# Patient Record
Sex: FEMALE | Race: OTHER | HISPANIC OR LATINO
[De-identification: names, ages, dates, MRNs, and addresses within clinical notes are randomized per-mention and may not be internally consistent; named-entity substitution may affect disease eponyms.]

---
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
## 2024-02-19 NOTE — ED PEDIATRIC NURSE NOTE - NS ED NURSE RECORDS SENT
February 19, 2024     Patient: Seth Gilbert Jr.  YOB: 2011  Date of Visit: 2/19/2024      To Whom it May Concern:    Seth iGlbert is under my professional care. Seth was seen in my office on 2/19/2024. Seth may return to sports and gym as tolerated with fingers wilder taped.      If you have any questions or concerns, please don't hesitate to call.         Sincerely,          Frankie Carter PA-C        CC: No Recipients   Medical Records sent

## 2024-12-19 NOTE — ED STATDOCS - CARE PLAN
Discharge Summary


Date of Admission: 


12/17/24 20:43





Date of Discharge: 





12/18/24


Admitting Physician: 


RODNEY GONZALES MD





Primary Care Provider: 


KARLA WOODSON








Allergies


Allergies





No Known Drug Allergies Allergy (Verified 12/17/24 17:04)


   











Hospital Summary





- Hospital Course


Hospital Course: 


12/18/24


84 years old very pleasant lady with past medical history significant for 

diabetes mellitus, hypertension, history of breast and bladder cancer currently 

in remission who lives with  quite independent in her daily life. She 

came to ER for evaluation of high blood pressure.  Patient told me she takes her

blood pressure medicine regularly and usually it runs around 130-1 40.  Today 

she felt extreme dizzy with some tingling on left side of the face and when he 

checked her blood pressure it was running 202/99.  She denies having any 

weakness numbness.  No nausea vomiting headache.  No other symptoms reported.  

In the ER initial pressure was 180/85 she was afebrile pulse of 81.  As well as 

blood workup concern all came out unremarkable except hide sugar 372.  Troponin 

0.01 to NT-proBNP 1000.  CT head was completely unremarkable. Teleneurologist 

evaluated patient advised hospitalization and giving Plavix 300 mg loading dose.

 She is admitted for MRI and to rule out stroke. Today she reports no sxs. She 

is up and walking around in the room. BP is at baseline. She states she feels 

some weakness but walking around the room fine. PT to eval. She may d/c this 

evening if MRI negative. She feels her BP may have been r/t anxiety. 








- Vitals & Intake/Output


Vital Signs: 





                                   Vital Signs











Temperature  97.9 F   12/18/24 12:00


 


Pulse Rate  83   12/18/24 12:00


 


Respiratory Rate  16   12/18/24 12:00


 


Blood Pressure  115/61   12/18/24 12:00


 


O2 Sat by Pulse Oximetry  96   12/18/24 12:00











Intake & Output: 





                                 Intake & Output











 12/16/24 12/17/24 12/18/24 12/19/24





 11:59 11:59 11:59 11:59


 


Intake Total   680 480


 


Balance   680 480


 


Weight   65.4 kg 














- Lab


Result Diagrams: 


                                 12/18/24 01:05





                                 12/18/24 01:05


Lab Results-Last 24 Hrs: 





                            Lab Results-Last 24 Hours











  12/17/24 12/17/24 12/17/24 Range/Units





  17:30 17:30 17:30 


 


WBC  7.9    (3.98-10.04)  x10^3/uL


 


RBC  3.62 L    (3.93-5.22)  x10^6/uL


 


Hgb  12.2    (11.2-15.7)  g/dL


 


Hct  37.1    (34.1-44.9)  %


 


MCV  102.5 H    (79.4-94.8)  fL


 


MCH  33.7 H    (25.6-32.2)  pg


 


MCHC  32.9    (32.2-35.5)  g/dL


 


RDW  13.4    (11.7-14.4)  %


 


Plt Count  372 H    (182-369)  x10^3/uL


 


MPV  9.4    (9.4-12.3)  fL


 


Gran %  57.5    (34.0-71.1)  %


 


Immature Gran % (Auto)  0.4    (0.001-0.429)  %


 


Nucleat RBC Rel Count  0.0    (0.00-0.2)  %


 


Eos # (Auto)  0.18    (0.04-0.36)  x10^3/uL


 


Immature Gran # (Auto)  0.03    (0.001-0.031)  x10^3u/L


 


Absolute Lymphs (auto)  2.03    (1.18-3.74)  x10^3/uL


 


Absolute Monos (auto)  1.01 H    (0.24-0.86)  x10^3/uL


 


Absolute Nucleated RBC  0.00    (0.00-0.012)  x10^3u/L


 


Lymphocytes %  25.7    (19.3-51.7)  %


 


Monocytes %  12.8 H    (4.7-12.5)  %


 


Eosinophils %  2.3    (0.7-5.8)  %


 


Basophils %  1.3 H    (0.1-1.2)  %


 


Absolute Granulocytes  4.56    (1.56-6.13)  x10^3/uL


 


Basophils #  0.10 H    (0.01-0.08)  x10^3/uL


 


Sodium   136   (135-145)  mmol/L


 


Potassium   4.6   (3.5-5.1)  mmol/L


 


Chloride   101   ()  mmol/L


 


Carbon Dioxide   26   (22-30)  mmol/L


 


Anion Gap   13.6   (5-15)  MEQ/L


 


BUN   25 H   (7-17)  mg/dL


 


Creatinine   1.40 H   (0.52-1.04)  mg/dL


 


Estimated GFR   36.9   ML/MIN


 


Glucose   123 H   ()  mg/dL


 


Calcium   10.0   (8.4-10.2)  mg/dL


 


Total Bilirubin   0.40   (0.2-1.3)  mg/dL


 


AST   26   (14-36)  U/L


 


ALT   18   (0-35)  U/L


 


Alkaline Phosphatase   68   ()  U/L


 


Troponin I    < 0.012  (0.000-0.033)  ng/mL


 


NT-Pro-B Natriuret Pep    1000  (<300)  pg/mL


 


Serum Total Protein   7.2   (6.3-8.2)  g/dL


 


Albumin   4.3   (3.5-5.0)  g/dL














  12/17/24 12/18/24 12/18/24 Range/Units





  21:25 01:00 01:05 


 


WBC    8.0  (3.98-10.04)  x10^3/uL


 


RBC    3.42 L  (3.93-5.22)  x10^6/uL


 


Hgb    11.4  (11.2-15.7)  g/dL


 


Hct    35.0  (34.1-44.9)  %


 


MCV    102.3 H  (79.4-94.8)  fL


 


MCH    33.3 H  (25.6-32.2)  pg


 


MCHC    32.6  (32.2-35.5)  g/dL


 


RDW    13.5  (11.7-14.4)  %


 


Plt Count    366  (182-369)  x10^3/uL


 


MPV    9.7  (9.4-12.3)  fL


 


Gran %     (34.0-71.1)  %


 


Immature Gran % (Auto)     (0.001-0.429)  %


 


Nucleat RBC Rel Count     (0.00-0.2)  %


 


Eos # (Auto)     (0.04-0.36)  x10^3/uL


 


Immature Gran # (Auto)     (0.001-0.031)  x10^3u/L


 


Absolute Lymphs (auto)     (1.18-3.74)  x10^3/uL


 


Absolute Monos (auto)     (0.24-0.86)  x10^3/uL


 


Absolute Nucleated RBC     (0.00-0.012)  x10^3u/L


 


Lymphocytes %     (19.3-51.7)  %


 


Monocytes %     (4.7-12.5)  %


 


Eosinophils %     (0.7-5.8)  %


 


Basophils %     (0.1-1.2)  %


 


Absolute Granulocytes     (1.56-6.13)  x10^3/uL


 


Basophils #     (0.01-0.08)  x10^3/uL


 


Sodium     (135-145)  mmol/L


 


Potassium     (3.5-5.1)  mmol/L


 


Chloride     ()  mmol/L


 


Carbon Dioxide     (22-30)  mmol/L


 


Anion Gap     (5-15)  MEQ/L


 


BUN     (7-17)  mg/dL


 


Creatinine     (0.52-1.04)  mg/dL


 


Estimated GFR     ML/MIN


 


Glucose     ()  mg/dL


 


Calcium     (8.4-10.2)  mg/dL


 


Total Bilirubin     (0.2-1.3)  mg/dL


 


AST     (14-36)  U/L


 


ALT     (0-35)  U/L


 


Alkaline Phosphatase     ()  U/L


 


Troponin I  0.015  0.014   (0.000-0.033)  ng/mL


 


NT-Pro-B Natriuret Pep     (<300)  pg/mL


 


Serum Total Protein     (6.3-8.2)  g/dL


 


Albumin     (3.5-5.0)  g/dL














  12/18/24 Range/Units





  01:05 


 


WBC   (3.98-10.04)  x10^3/uL


 


RBC   (3.93-5.22)  x10^6/uL


 


Hgb   (11.2-15.7)  g/dL


 


Hct   (34.1-44.9)  %


 


MCV   (79.4-94.8)  fL


 


MCH   (25.6-32.2)  pg


 


MCHC   (32.2-35.5)  g/dL


 


RDW   (11.7-14.4)  %


 


Plt Count   (182-369)  x10^3/uL


 


MPV   (9.4-12.3)  fL


 


Gran %   (34.0-71.1)  %


 


Immature Gran % (Auto)   (0.001-0.429)  %


 


Nucleat RBC Rel Count   (0.00-0.2)  %


 


Eos # (Auto)   (0.04-0.36)  x10^3/uL


 


Immature Gran # (Auto)   (0.001-0.031)  x10^3u/L


 


Absolute Lymphs (auto)   (1.18-3.74)  x10^3/uL


 


Absolute Monos (auto)   (0.24-0.86)  x10^3/uL


 


Absolute Nucleated RBC   (0.00-0.012)  x10^3u/L


 


Lymphocytes %   (19.3-51.7)  %


 


Monocytes %   (4.7-12.5)  %


 


Eosinophils %   (0.7-5.8)  %


 


Basophils %   (0.1-1.2)  %


 


Absolute Granulocytes   (1.56-6.13)  x10^3/uL


 


Basophils #   (0.01-0.08)  x10^3/uL


 


Sodium  136  (135-145)  mmol/L


 


Potassium  4.6  (3.5-5.1)  mmol/L


 


Chloride  104  ()  mmol/L


 


Carbon Dioxide  24  (22-30)  mmol/L


 


Anion Gap  12.6  (5-15)  MEQ/L


 


BUN  22 H  (7-17)  mg/dL


 


Creatinine  1.24 H  (0.52-1.04)  mg/dL


 


Estimated GFR  42.7  ML/MIN


 


Glucose  135 H  ()  mg/dL


 


Calcium  10.0  (8.4-10.2)  mg/dL


 


Total Bilirubin   (0.2-1.3)  mg/dL


 


AST   (14-36)  U/L


 


ALT   (0-35)  U/L


 


Alkaline Phosphatase   ()  U/L


 


Troponin I   (0.000-0.033)  ng/mL


 


NT-Pro-B Natriuret Pep   (<300)  pg/mL


 


Serum Total Protein   (6.3-8.2)  g/dL


 


Albumin   (3.5-5.0)  g/dL














- Radiology Exams


Ordered Rad Exams-Entire Visit: 





                              Radiology Procedures











 Category Date Time Status


 


 HEAD WITHOUT CONTRAST [CT] Stat Exams  12/17/24 17:13 Completed


 


 MRI BRAIN W/O CONTRAST [MRI] Routine Exams  12/18/24 10:23 Taken














- Procedures and Test


Procedures and Tests throughout Hospitalization: 





                            Therapy Orders & Screens





12/18/24 09:43


PT Eval & Treat (MD Order) ONCE 


   Reason for Eval:: weakness


   Diagnosis: HYPERTENSION, PARETHESIA, DIZZINESS














Discharge Exam


General Appearance: no apparent distress, alert


Neurologic Exam: alert, oriented x 3, cooperative, CNs II-XII nml as tested, 

normal mood/affect, nml cerebellar function, sensation nml, No motor deficits


Eye Exam: PERRL, EOMI, eyes nml inspection


Ears, Nose, Throat Exam: normal ENT inspection, pharynx normal, moist mucous 

membranes


Neck Exam: normal inspection, non-tender, supple, full range of motion


Respiratory Exam: normal breath sounds, lungs clear, No respiratory distress


Cardiovascular Exam: regular rate/rhythm, normal heart sounds


Gastrointestinal/Abdomen Exam: soft, No tenderness, No mass


Pelvic Exam: deferred


Rectal Exam: deferred


Back Exam: normal inspection, normal range of motion, No CVA tenderness, No 

vertebral tenderness


Extremity Exam: normal inspection, normal range of motion


Skin Exam: normal color, warm, dry





Final Diagnosis/Problem List





- Final Discharge Diagnosis/Problem


(1) Dizziness


Current Visit: Yes   Status: Resolved   


Assessment & Plan: 


- resolved


- CT head negative


- MRI pending


Code(s): R42 - DIZZINESS AND GIDDINESS   





(2) Facial paresthesia


Current Visit: Yes   Status: Resolved   


Assessment & Plan: 


- resolved- see above plan of care


Code(s): R20.2 - PARESTHESIA OF SKIN   





(3) Hypertension


Current Visit: Yes   Status: Chronic   


Assessment & Plan: 


- BP stable


- Continue home meds





Code(s): I10 - ESSENTIAL (PRIMARY) HYPERTENSION   





- Discharge


Discharge Date: 12/18/24


Disposition: Home, Self-Care


Condition: Stable


Prescriptions: 


Continue


   Metformin HCl 500 mg*** [Glucophage 500 MG***] 500 mg PO BIDWM


   Acetaminophen [Tylenol Arthritis] 2 tab PO DAILY PRN


     PRN Reason: Pain


   Anastrozole 1 mg PO DAILY


   Cholecalciferol (Vitamin D3) [Vitamin D3] 1 tab PO DAILY


   Lisinopril/Hydrochlorothiazide [Lisinopril-Hctz 20-12.5 mg Tab] 1 each PO 

DAILY


   Brimonidine Tartrate 1 drop OP BID


   Latanoprostene Bunod [Vyzulta] 1 drop OP HS


Follow up with: 


KARLA WOODSON MD [Primary Care Provider] - 12/30/24 3:15 pm
Discharge Summary


Date of Admission: 


12/17/24 20:43





Date of Discharge: 


12/18/24





Admitting Physician: 


RODNEY GONZALES MD





Primary Care Provider: 


KARLA WOODSON








Allergies


Allergies





No Known Drug Allergies Allergy (Verified 12/17/24 17:04)


   











Hospital Summary





- Hospital Course


Hospital Course: 


12/18/24


84 years old very pleasant lady with past medical history significant for 

diabetes mellitus, hypertension, history of breast and bladder cancer currently 

in remission who lives with  quite independent in her daily life. She 

came to ER for evaluation of high blood pressure.  Patient told me she takes her

blood pressure medicine regularly and usually it runs around 130-1 40.  Today 

she felt extreme dizzy with some tingling on left side of the face and when he 

checked her blood pressure it was running 202/99.  She denies having any 

weakness numbness.  No nausea vomiting headache.  No other symptoms reported.  

In the ER initial pressure was 180/85 she was afebrile pulse of 81.  As well as 

blood workup concern all came out unremarkable except hide sugar 372.  Troponin 

0.01 to NT-proBNP 1000.  CT head was completely unremarkable. Teleneurologist 

evaluated patient advised hospitalization and giving Plavix 300 mg loading dose.

 She is admitted for MRI and to rule out stroke. Today she reports no sxs. She 

is up and walking around in the room. BP is at baseline. She states she feels 

some weakness but walking around the room fine. PT to eval. MRI negative. She 

feels her BP may have been r/t anxiety as she had a grandson pass away 

unexpectedly. Bp elevated this afternoon and BP med restarted. She wants to stay

another night and case management ok with this. Will Continue to monitor BP 

overnight and d/c in the AM. 





12/19/24


Pt sitting in the chair ready to d/c this AM. BP controlled with medication. 

Discussed to take her morning meds as prescribed and to f/u with Dr. Woodson. She 

is feeling much better she states. Discussed to keep a log of BP results and how

to take BP at home. Discussed to take reading results to Dr. Woodson to show him 

readings throughout the day. She denies any further concerns at this time. 








- Vitals & Intake/Output


Vital Signs: 





                                   Vital Signs











Temperature  97.1 F   12/19/24 07:08


 


Pulse Rate  77   12/19/24 07:08


 


Respiratory Rate  16   12/19/24 07:08


 


Blood Pressure  142/79   12/19/24 07:08


 


O2 Sat by Pulse Oximetry  94 L  12/19/24 07:08











Intake & Output: 





                                 Intake & Output











 12/16/24 12/17/24 12/18/24 12/19/24





 11:59 11:59 11:59 11:59


 


Intake Total   680 1120


 


Output Total    450


 


Balance   680 670


 


Weight   65.4 kg 














- Lab


Result Diagrams: 


                                 12/18/24 01:05





                                 12/18/24 01:05





- Radiology Exams


Ordered Rad Exams-Entire Visit: 





                              Radiology Procedures











 Category Date Time Status


 


 HEAD WITHOUT CONTRAST [CT] Stat Exams  12/17/24 17:13 Completed


 


 MRI BRAIN W/O CONTRAST [MRI] Routine Exams  12/18/24 10:23 Completed














- Procedures and Test


Procedures and Tests throughout Hospitalization: 





                            Therapy Orders & Screens





12/18/24 09:43


PT Eval & Treat (MD Order) ONCE 


   Reason for Eval:: weakness


   Diagnosis: HYPERTENSION, PARETHESIA, DIZZINESS














Discharge Exam


General Appearance: no apparent distress, alert


Neurologic Exam: alert, oriented x 3, cooperative, normal mood/affect, nml 

cerebellar function, sensation nml, No motor deficits


Eye Exam: PERRL, EOMI, eyes nml inspection


Ears, Nose, Throat Exam: normal ENT inspection, pharynx normal, moist mucous 

membranes


Neck Exam: normal inspection, non-tender, supple, full range of motion


Respiratory Exam: normal breath sounds, lungs clear, No respiratory distress


Cardiovascular Exam: regular rate/rhythm, normal heart sounds


Gastrointestinal/Abdomen Exam: soft, No tenderness, No mass


Pelvic Exam: deferred


Rectal Exam: deferred


Back Exam: normal inspection, normal range of motion, No CVA tenderness, No 

vertebral tenderness


Extremity Exam: normal inspection, normal range of motion


Skin Exam: normal color, warm, dry





Final Diagnosis/Problem List





- Final Discharge Diagnosis/Problem


(1) Dizziness


Current Visit: Yes   Status: Resolved   Code(s): R42 - DIZZINESS AND GIDDINESS  

 





(2) Facial paresthesia


Current Visit: Yes   Status: Resolved   Code(s): R20.2 - PARESTHESIA OF SKIN   





(3) Hypertension


Current Visit: Yes   Status: Chronic   Code(s): I10 - ESSENTIAL (PRIMARY) 

HYPERTENSION   





(4) Sinus congestion


Current Visit: Yes   Status: Acute   


Assessment & Plan: 


(1) Dizziness


Current Visit: Yes   Status: Resolved   


Assessment & Plan: 


- resolved


- CT head negative


- MRI negative


Code(s): R42 - DIZZINESS AND GIDDINESS   





(2) Facial paresthesia


Current Visit: Yes   Status: Resolved   


Assessment & Plan: 


- resolved- see above plan of care


Code(s): R20.2 - PARESTHESIA OF SKIN   





(3) Hypertension


Current Visit: Yes   Status: Chronic   


Assessment & Plan: 


- BP stable


- Continue home meds


Code(s): I10 - ESSENTIAL (PRIMARY) HYPERTENSION   





(4) Sinus congestion


Current Visit: Yes   Status: Acute   


Assessment & Plan: 


- flonase








- Discharge


Discharge Date: 12/19/24


Disposition: Home, Self-Care


Condition: Stable


Prescriptions: 


Continue


   Metformin HCl 500 mg*** [Glucophage 500 MG***] 500 mg PO BIDWM


   Acetaminophen [Tylenol Arthritis] 2 tab PO DAILY PRN


     PRN Reason: Pain


   Anastrozole 1 mg PO DAILY


   Cholecalciferol (Vitamin D3) [Vitamin D3] 1 tab PO DAILY


   Lisinopril/Hydrochlorothiazide [Lisinopril-Hctz 20-12.5 mg Tab] 1 each PO 

DAILY


   Brimonidine Tartrate 1 drop OP BID


   Latanoprostene Bunod [Vyzulta] 1 drop OP HS


Follow up with: 


KARLA WOODSON MD [Primary Care Provider] - 12/30/24 3:15 pm
Principal Discharge DX:	Acute appendicitis

## 2025-02-05 NOTE — ED ADULT NURSE NOTE - PATIENT/CAREGIVER ACCEPTED INTERPRETER SERVICES
Patient presents with vaginal bleeding during pregnancy.  Labs UA sono done.  Confirmed IUP with subchorionic hemorrhage.  All results discussed.  O+ blood type.  Patient has follow-up appointment on Monday.  Discharged with OB follow-up and return precautions.
yes

## 2025-03-19 ENCOUNTER — EMERGENCY (EMERGENCY)
Facility: HOSPITAL | Age: 20
LOS: 1 days | Discharge: HOME | End: 2025-03-19
Attending: EMERGENCY MEDICINE
Payer: COMMERCIAL

## 2025-03-19 VITALS
WEIGHT: 183.65 LBS | DIASTOLIC BLOOD PRESSURE: 66 MMHG | SYSTOLIC BLOOD PRESSURE: 106 MMHG | RESPIRATION RATE: 18 BRPM | HEART RATE: 83 BPM | OXYGEN SATURATION: 100 % | TEMPERATURE: 98 F

## 2025-03-19 VITALS — DIASTOLIC BLOOD PRESSURE: 70 MMHG | SYSTOLIC BLOOD PRESSURE: 109 MMHG

## 2025-03-19 LAB
ALBUMIN SERPL ELPH-MCNC: 4.6 G/DL — SIGNIFICANT CHANGE UP (ref 3.3–5.2)
ALP SERPL-CCNC: 99 U/L — SIGNIFICANT CHANGE UP (ref 40–120)
ALT FLD-CCNC: 20 U/L — SIGNIFICANT CHANGE UP
ANION GAP SERPL CALC-SCNC: 15 MMOL/L — SIGNIFICANT CHANGE UP (ref 5–17)
APPEARANCE UR: ABNORMAL
APTT BLD: 34.1 SEC — SIGNIFICANT CHANGE UP (ref 24.5–35.6)
AST SERPL-CCNC: 16 U/L — SIGNIFICANT CHANGE UP
BACTERIA # UR AUTO: ABNORMAL /HPF
BASOPHILS # BLD AUTO: 0.05 K/UL — SIGNIFICANT CHANGE UP (ref 0–0.2)
BASOPHILS NFR BLD AUTO: 0.4 % — SIGNIFICANT CHANGE UP (ref 0–2)
BILIRUB SERPL-MCNC: 0.4 MG/DL — SIGNIFICANT CHANGE UP (ref 0.4–2)
BILIRUB UR-MCNC: NEGATIVE — SIGNIFICANT CHANGE UP
BLD GP AB SCN SERPL QL: SIGNIFICANT CHANGE UP
BUN SERPL-MCNC: 9.4 MG/DL — SIGNIFICANT CHANGE UP (ref 8–20)
CALCIUM SERPL-MCNC: 9.5 MG/DL — SIGNIFICANT CHANGE UP (ref 8.4–10.5)
CAST: 1 /LPF — SIGNIFICANT CHANGE UP (ref 0–4)
CHLORIDE SERPL-SCNC: 101 MMOL/L — SIGNIFICANT CHANGE UP (ref 96–108)
CO2 SERPL-SCNC: 23 MMOL/L — SIGNIFICANT CHANGE UP (ref 22–29)
COLOR SPEC: YELLOW — SIGNIFICANT CHANGE UP
CREAT SERPL-MCNC: 0.44 MG/DL — LOW (ref 0.5–1.3)
DIFF PNL FLD: ABNORMAL
EGFR: 142 ML/MIN/1.73M2 — SIGNIFICANT CHANGE UP
EGFR: 142 ML/MIN/1.73M2 — SIGNIFICANT CHANGE UP
EOSINOPHIL # BLD AUTO: 0.24 K/UL — SIGNIFICANT CHANGE UP (ref 0–0.5)
EOSINOPHIL NFR BLD AUTO: 1.8 % — SIGNIFICANT CHANGE UP (ref 0–6)
GLUCOSE SERPL-MCNC: 76 MG/DL — SIGNIFICANT CHANGE UP (ref 70–99)
GLUCOSE UR QL: NEGATIVE MG/DL — SIGNIFICANT CHANGE UP
HCG SERPL-ACNC: <4 MIU/ML — SIGNIFICANT CHANGE UP
HCT VFR BLD CALC: 39.7 % — SIGNIFICANT CHANGE UP (ref 34.5–45)
HGB BLD-MCNC: 12.6 G/DL — SIGNIFICANT CHANGE UP (ref 11.5–15.5)
IMM GRANULOCYTES # BLD AUTO: 0.06 K/UL — SIGNIFICANT CHANGE UP (ref 0–0.07)
IMM GRANULOCYTES NFR BLD AUTO: 0.4 % — SIGNIFICANT CHANGE UP (ref 0–0.9)
INR BLD: 1.09 RATIO — SIGNIFICANT CHANGE UP (ref 0.85–1.16)
KETONES UR-MCNC: 15 MG/DL
LEUKOCYTE ESTERASE UR-ACNC: NEGATIVE — SIGNIFICANT CHANGE UP
LYMPHOCYTES # BLD AUTO: 3.37 K/UL — HIGH (ref 1–3.3)
LYMPHOCYTES NFR BLD AUTO: 25.2 % — SIGNIFICANT CHANGE UP (ref 13–44)
MCHC RBC-ENTMCNC: 28.6 PG — SIGNIFICANT CHANGE UP (ref 27–34)
MCHC RBC-ENTMCNC: 31.7 G/DL — LOW (ref 32–36)
MCV RBC AUTO: 90 FL — SIGNIFICANT CHANGE UP (ref 80–100)
MONOCYTES # BLD AUTO: 0.96 K/UL — HIGH (ref 0–0.9)
MONOCYTES NFR BLD AUTO: 7.2 % — SIGNIFICANT CHANGE UP (ref 2–14)
NEUTROPHILS # BLD AUTO: 8.67 K/UL — HIGH (ref 1.8–7.4)
NEUTROPHILS NFR BLD AUTO: 65 % — SIGNIFICANT CHANGE UP (ref 43–77)
NITRITE UR-MCNC: NEGATIVE — SIGNIFICANT CHANGE UP
NRBC # BLD AUTO: 0 K/UL — SIGNIFICANT CHANGE UP (ref 0–0)
NRBC # FLD: 0 K/UL — SIGNIFICANT CHANGE UP (ref 0–0)
NRBC BLD AUTO-RTO: 0 /100 WBCS — SIGNIFICANT CHANGE UP (ref 0–0)
PH UR: 6.5 — SIGNIFICANT CHANGE UP (ref 5–8)
PLATELET # BLD AUTO: 296 K/UL — SIGNIFICANT CHANGE UP (ref 150–400)
PMV BLD: 11 FL — SIGNIFICANT CHANGE UP (ref 7–13)
POTASSIUM SERPL-MCNC: 4 MMOL/L — SIGNIFICANT CHANGE UP (ref 3.5–5.3)
POTASSIUM SERPL-SCNC: 4 MMOL/L — SIGNIFICANT CHANGE UP (ref 3.5–5.3)
PROT SERPL-MCNC: 8 G/DL — SIGNIFICANT CHANGE UP (ref 6.6–8.7)
PROT UR-MCNC: SIGNIFICANT CHANGE UP MG/DL
PROTHROM AB SERPL-ACNC: 12.6 SEC — SIGNIFICANT CHANGE UP (ref 9.9–13.4)
RAPID RVP RESULT: SIGNIFICANT CHANGE UP
RBC # BLD: 4.41 M/UL — SIGNIFICANT CHANGE UP (ref 3.8–5.2)
RBC # FLD: 13.3 % — SIGNIFICANT CHANGE UP (ref 10.3–14.5)
RBC CASTS # UR COMP ASSIST: 9 /HPF — HIGH (ref 0–4)
SARS-COV-2 RNA SPEC QL NAA+PROBE: SIGNIFICANT CHANGE UP
SODIUM SERPL-SCNC: 138 MMOL/L — SIGNIFICANT CHANGE UP (ref 135–145)
SP GR SPEC: 1.01 — SIGNIFICANT CHANGE UP (ref 1–1.03)
SQUAMOUS # UR AUTO: 3 /HPF — SIGNIFICANT CHANGE UP (ref 0–5)
UROBILINOGEN FLD QL: 0.2 MG/DL — SIGNIFICANT CHANGE UP (ref 0.2–1)
WBC # BLD: 13.35 K/UL — HIGH (ref 3.8–10.5)
WBC # FLD AUTO: 13.35 K/UL — HIGH (ref 3.8–10.5)
WBC UR QL: 5 /HPF — SIGNIFICANT CHANGE UP (ref 0–5)

## 2025-03-19 PROCEDURE — 74177 CT ABD & PELVIS W/CONTRAST: CPT | Mod: 26

## 2025-03-19 PROCEDURE — 99285 EMERGENCY DEPT VISIT HI MDM: CPT

## 2025-03-19 RX ORDER — CEFTRIAXONE 500 MG/1
1000 INJECTION, POWDER, FOR SOLUTION INTRAMUSCULAR; INTRAVENOUS ONCE
Refills: 0 | Status: DISCONTINUED | OUTPATIENT
Start: 2025-03-19 | End: 2025-03-19

## 2025-03-19 RX ORDER — KETOROLAC TROMETHAMINE 30 MG/ML
15 INJECTION, SOLUTION INTRAMUSCULAR; INTRAVENOUS ONCE
Refills: 0 | Status: DISCONTINUED | OUTPATIENT
Start: 2025-03-19 | End: 2025-03-19

## 2025-03-19 RX ORDER — CEFTRIAXONE 500 MG/1
1000 INJECTION, POWDER, FOR SOLUTION INTRAMUSCULAR; INTRAVENOUS ONCE
Refills: 0 | Status: COMPLETED | OUTPATIENT
Start: 2025-03-19 | End: 2025-03-19

## 2025-03-19 RX ORDER — CEFPODOXIME PROXETIL 200 MG/1
1 TABLET, FILM COATED ORAL
Qty: 14 | Refills: 0
Start: 2025-03-19 | End: 2025-03-25

## 2025-03-19 RX ADMIN — Medication 1000 MILLILITER(S): at 15:06

## 2025-03-19 RX ADMIN — Medication 1000 MILLILITER(S): at 17:06

## 2025-03-19 RX ADMIN — CEFTRIAXONE 1000 MILLIGRAM(S): 500 INJECTION, POWDER, FOR SOLUTION INTRAMUSCULAR; INTRAVENOUS at 15:06

## 2025-03-19 RX ADMIN — KETOROLAC TROMETHAMINE 15 MILLIGRAM(S): 30 INJECTION, SOLUTION INTRAMUSCULAR; INTRAVENOUS at 15:06

## 2025-03-19 NOTE — ED ADULT TRIAGE NOTE - LANGUAGE ASSISTANCE NEEDED
[place] speaking triage/No-Patient/Caregiver offered and refused free interpretation services.

## 2025-03-19 NOTE — ED PROVIDER NOTE - PATIENT PORTAL LINK FT
You can access the FollowMyHealth Patient Portal offered by [facility] by registering at the following website: [email or web address]. By joining [facility]’s FollowMyHealth portal, you will also be able to view your health information using other applications (apps) compatible with our system.

## 2025-03-19 NOTE — ED PROVIDER NOTE - CLINICAL SUMMARY MEDICAL DECISION MAKING FREE TEXT BOX
20 yo female hx of ecoli bacteremia 2/2 to ascending UTI, presenting to the ED with abd pain, fever reported as 108 axillary yesterday with associated dysuria, polyuria and left flank pain, seen by UC today and referred to the ED for further evaluation. 18 yo female hx of ecoli bacteremia 2/2 to ascending UTI, presenting to the ED with abd pain, fever reported as 108 axillary yesterday with associated dysuria, polyuria and left flank pain, seen by UC today and referred to the ED for further evaluation.    KARYN Gay: Received sign out on patient pending CT results. CT showing Possible cystitis. No CT evidence of pyelonephritis, however, exclusion of this diagnosis   should be based on clinical grounds. Covering pt with vantin to pharmacy due to having urinary ssx, awaiting culture. Pt tolerating PO. Pt to be discharged. Return precautions discussed with patient. Patient agrees with plan for discharge.

## 2025-03-19 NOTE — ED PROVIDER NOTE - NSFOLLOWUPINSTRUCTIONS_ED_ALL_ED_FT
Please take antibiotics as directed.    Please follow up with PCP within 3 days.    Please follow up with urologist within 3 days.    Urinary Tract Infection    A urinary tract infection (UTI) is an infection of any part of the urinary tract, which includes the kidneys, ureters, bladder, and urethra. Risk factors include ignoring your need to urinate, wiping back to front if female, being an uncircumcised male, and having diabetes or a weak immune system. Symptoms include frequent urination, pain or burning with urination, foul smelling urine, cloudy urine, pain in the lower abdomen, blood in the urine, and fever. If you were prescribed an antibiotic medicine, take it as told by your health care provider. Do not stop taking the antibiotic even if you start to feel better.    SEEK IMMEDIATE MEDICAL CARE IF YOU HAVE ANY OF THE FOLLOWING SYMPTOMS: severe back or abdominal pain, fever, inability to keep fluids or medicine down, dizziness/lightheadedness, or a change in mental status.

## 2025-03-19 NOTE — ED PROVIDER NOTE - PHYSICAL EXAMINATION
Gen: Well appearing in NAD  Head: NC/AT  Neck: trachea midline  Resp:  No distress   ABD soft nd + periumbilical and left flank tenderness   Ext: no deformities  Neuro:  A&O appears non focal  Skin:  Warm and dry as visualized  Psych:  Normal affect and mood

## 2025-03-19 NOTE — ED ADULT NURSE NOTE - OBJECTIVE STATEMENT
18 YO female presented to the Ed with c/o left sided flank pain radiating to LLQ since 1 week. associated with fever, chill and nausea. c/o burning micturition with dysuria since 1 week. Recently diagnosed with Ecolli bacteremia in February 2025.  Talon at bedside, blood work and cultures sent, IV fluids and antibiotics started, awaiting CT scan. Denies chest pian, SOB, vomiting or diarrhea.

## 2025-03-19 NOTE — ED PROVIDER NOTE - CARE PROVIDER_API CALL
Tyrone Prieto Echo  Urology  [address] [place] [id number]  Phone: [phone]  Fax: [phone]  Follow Up Time:

## 2025-03-19 NOTE — ED PROVIDER NOTE - OBJECTIVE STATEMENT
18 yo female hx of ecoli bacteremia 2/2 to ascending UTI, presenting to the ED with abd pain, fever reported as 108 axillary yesterday with associated dysuria, polyuria and left flank pain, seen by UC today and referred to the ED for further evaluation.

## 2025-03-19 NOTE — ED PROVIDER NOTE - ATTENDING APP SHARED VISIT CONTRIBUTION OF CARE
18 yo female hx of ecoli bacteremia 2/2 to ascending UTI, presenting to the ED with abd pain, fever reported as 108 axillary yesterday with associated dysuria, polyuria and left flank pain, seen by UC today and referred to the ED for further evaluation.pe Abdomen mild left lower quadrant pain with left flank pain no CVA tenderness will check labs, UA urine culture and close follow-up with primary care doctor and urology

## 2025-03-20 LAB
CULTURE RESULTS: SIGNIFICANT CHANGE UP
SPECIMEN SOURCE: SIGNIFICANT CHANGE UP

## 2025-03-21 PROCEDURE — 80053 COMPREHEN METABOLIC PANEL: CPT

## 2025-03-21 PROCEDURE — 84702 CHORIONIC GONADOTROPIN TEST: CPT

## 2025-03-21 PROCEDURE — 85025 COMPLETE CBC W/AUTO DIFF WBC: CPT

## 2025-03-21 PROCEDURE — 86900 BLOOD TYPING SEROLOGIC ABO: CPT

## 2025-03-21 PROCEDURE — 87040 BLOOD CULTURE FOR BACTERIA: CPT

## 2025-03-21 PROCEDURE — 81001 URINALYSIS AUTO W/SCOPE: CPT

## 2025-03-21 PROCEDURE — 85610 PROTHROMBIN TIME: CPT

## 2025-03-21 PROCEDURE — 83605 ASSAY OF LACTIC ACID: CPT

## 2025-03-21 PROCEDURE — 96375 TX/PRO/DX INJ NEW DRUG ADDON: CPT

## 2025-03-21 PROCEDURE — 87086 URINE CULTURE/COLONY COUNT: CPT

## 2025-03-21 PROCEDURE — 74177 CT ABD & PELVIS W/CONTRAST: CPT | Mod: MC

## 2025-03-21 PROCEDURE — 96374 THER/PROPH/DIAG INJ IV PUSH: CPT | Mod: XU

## 2025-03-21 PROCEDURE — 36415 COLL VENOUS BLD VENIPUNCTURE: CPT

## 2025-03-21 PROCEDURE — 85730 THROMBOPLASTIN TIME PARTIAL: CPT

## 2025-03-21 PROCEDURE — T1013: CPT

## 2025-03-21 PROCEDURE — 0225U NFCT DS DNA&RNA 21 SARSCOV2: CPT

## 2025-03-21 PROCEDURE — 99284 EMERGENCY DEPT VISIT MOD MDM: CPT | Mod: 25

## 2025-03-21 PROCEDURE — 86901 BLOOD TYPING SEROLOGIC RH(D): CPT

## 2025-03-21 PROCEDURE — 86850 RBC ANTIBODY SCREEN: CPT

## 2025-03-21 PROCEDURE — 96361 HYDRATE IV INFUSION ADD-ON: CPT

## 2025-03-25 LAB
CULTURE RESULTS: SIGNIFICANT CHANGE UP
SPECIMEN SOURCE: SIGNIFICANT CHANGE UP

## 2025-04-02 ENCOUNTER — EMERGENCY (EMERGENCY)
Facility: HOSPITAL | Age: 20
LOS: 1 days | Discharge: LEFT BEFORE DISCHARGE | End: 2025-04-02
Admitting: EMERGENCY MEDICINE
Payer: MEDICAID

## 2025-04-02 VITALS
RESPIRATION RATE: 18 BRPM | SYSTOLIC BLOOD PRESSURE: 105 MMHG | DIASTOLIC BLOOD PRESSURE: 69 MMHG | HEART RATE: 76 BPM | TEMPERATURE: 98 F | OXYGEN SATURATION: 99 % | WEIGHT: 188.27 LBS

## 2025-04-02 PROCEDURE — L9991: CPT

## 2025-04-02 NOTE — ED ADULT TRIAGE NOTE - CHIEF COMPLAINT QUOTE
C/O pain to pelvis x2 weeks. "I was told when I was last seen here that it was inflammation an that I need to see a pcp but I couldn't get an appointment". Pt states she also has new redness to neck that itches. Denies fevers. Denies vaginal bleeding.

## 2025-04-02 NOTE — ED ADULT TRIAGE NOTE - NS ED NURSE BANDS TYPE
"[facility] [phone]    Sameer Sosa (62 y.o. Male)     Date of Birth Social Security Number Address Home Phone MRN    1956  [address] [id number] [phone] [id number]    [employer] Mar[employer] Status          Non-Mormonism Legally        Admission Date Admission Type Admitting Provider Attending Provider Department, Room/Bed    10/18/18 Elective Alber Justin MD Resser, John Randall, MD [address], 379/1    Discharge Date Discharge Disposition Discharge Destination                       Attending Provider:  Alber Justin MD    Allergies:  Codeine    Isolation:  None   Infection:  None   Code Status:  CPR    Ht:  169 cm (66.54\")   Wt:  65.1 kg (143 lb 8 oz)    Admission Cmt:  None   Principal Problem:  None                Active Insurance as of 10/18/2018     Primary Coverage     Payor Plan Insurance Group Employer/Plan Group    MEDICARE MEDICARE A & B      Payor Plan Address Payor Plan Phone Number Effective From Effective To    [address] [phone] 2008     [facility] [id number]       Subscriber Name Subscriber Birth Date Member ID       SAMEER SOSA 1956 [id number]                 Emergency Contacts      (Rel.) Home Phone Work Phone Mobile Phone    Christina Vazquez (Care Giver) [phone] -- --    CliveJanett (Spouse) [phone] -- --        [address] [address]  Phone:  [phone]  Fax:          Patient:     Sameer Sosa MRN:  [id number]   [address] [facility] [address] :  1956  SSN:    Phone: No phone on record Sex:  M      INSURANCE PAYOR PLAN GROUP # SUBSCRIBER ID   Primary:    MEDICARE [id number]   [id number]   Admitting Diagnosis: Oropharyngeal cancer (CMS/HCC) [C10.9]  Order Date:  Oct 18, 2018         Nutren 2.0 (TwoCal)       (Order ID: [id number])     Diagnosis:         Priority:  Routine Expected Date:   Expiration Date:   "      Interval:  Diet Effective ____ Count:    Comments: Start TF boluses of 2-may 10/19 per MD orders. Start boluses at 120mL as tolerated and increase to bolus goal of 240mL, 4x/day. Flush g-tube with 30mL of water before and after each bolus. Bolus an additional 250mL of water, 4x/day for hydration needs.  Tube Feeding Formula: Nutren 2.0 (TwoCal)  Tube Feeding Type: Bolus  Bolus Feeding (mL): 120  Increase Bolus By (mL): 120  Every __ Hours: 6  Goal Bolus Amount (mL): 240  Final Goal is (mL): 240  Every: 6 Hrs  Total Daily Volume to Deliver (mL/day): 960  Water Flush Amount (mL): 60  Water Flush Frequency: Every Bolus  Water Flush Bolus (mL): 250  Water Flush Bolus Frequency: 4x Daily     Specimen Type:   Specimen Source:   Specimen Taken Date:   Specimen Taken Time:                   Verbal Order Mode: Per protocol: no cosign required  Authorizing Provider:Alber Justin MD  Authorizing Provider's NPI: [id number]  Order Entered By: Stella Knott RD 10/18/2018  4:21 PM     Electronically signed by: N/A            History & Physical      Ayanna Early MD at 10/18/2018  7:31 AM            Ayanna Early MD  H&P    Patient Care Team:  Christian Castellon MD as PCP - General (Internal Medicine)  Dorian, Sin Eisenberg MD as Referring Physician (General Practice)  Alber Justin MD as Consulting Physician (Otolaryngology)  Kriss Dominguez MD as Referring Physician (Hematology and Oncology)  Hadley Marie III, MD as Consulting Physician (Radiation Oncology)  Chayo Gould PA-C as Physician Assistant (Radiation Oncology)  Ayanna Early MD as Surgeon (General Surgery)    Chief complaint   inability to swallow    Subjective     Sameer Tavarez  is a 62 y.o. male presents with inability to swallow due to head and neck cancer.  His had a G-tube in the past that came out.  It took about 2 months to heal.  He is here to get a closure of his tracheo and he is fistula.  I been asked to  place a G-tube.  He has also had a TIF procedure for his reflux about 2 years ago.      Review of Systems   Pertinent items are noted in HPI, all other systems reviewed and negative    History  Past Medical History:   Diagnosis Date   • Arthritis    • Coronary artery disease    • Depression    • Diabetes mellitus (CMS/HCC)    • Difficulty urinating    • Enlarged prostate    • GERD (gastroesophageal reflux disease)    • History of BPH    • History of transfusion    • Hyperlipidemia    • Hypertension    • Neuropathy    • Oropharyngeal cancer (CMS/HCC) 08/27/2017   • Seizure (CMS/HCC)     diabetic   • Severe esophageal dysplasia    • Stroke (CMS/HCC)    • TB (pulmonary tuberculosis) 2004   • Tracheostomy present (CMS/[facility])     PLUGGED     Past Surgical History:   Procedure Laterality Date   • CARDIAC SURGERY      9-10 stents   • CHOLECYSTECTOMY     • CORONARY ARTERY BYPASS GRAFT      2009   • FRACTURE SURGERY     • GTUBE REPLACEMENT N/A 10/6/2017    Procedure: GASTROSTOMY TUBE REPLACEMENT, port placement;  Surgeon: Jayne Phillips MD;  Location:  [facility] OR;  Service:    • HERNIA REPAIR     • LARYNGOSCOPY N/A 10/11/2017    Procedure: DIRECT LARYNGOSCOPY WITH BIOPSY;  Surgeon: Darin Neal MD;  Location:  [facility] OR;  Service:    • LARYNGOSCOPY N/A 6/25/2018    Procedure: DIRECT LARYNGOSCOPY WITH BIOPSY;  Surgeon: Alber Justin MD;  Location:  [facility] OR;  Service: ENT   • NISSEN FUNDOPLICATION LAPAROSCOPIC     • WI DENTAL SURGERY PROCEDURE N/A 10/11/2017    Procedure: TOOTH EXTRACTION;  Surgeon: Darin Neal MD;  Location:  [facility] OR;  Service: ENT   • WI INSJ TUNNELED CVC W/O SUBQ PORT/ AGE 5 YR/> Left 10/6/2017    Procedure: INSERTION VENOUS ACCESS DEVICE;  Surgeon: Jayne Phillips MD;  Location:  [facility] OR;  Service: General   • SKIN BIOPSY     • TESTICLE SURGERY      tubes implanted for drainage   • TONSILLECTOMY     • TRACHEOSTOMY N/A 10/5/2017    Procedure: Awake tracheostomy; DIRECT LARYNGOSCOPY  WITH BIOPSY;  Surgeon: Alber Justin MD;  Location: [facility];  Service:      Family History   Problem Relation Age of Onset   • Stroke Mother    • Heart disease Father    • Heart disease Brother    • Cancer Brother      Social History   Substance Use Topics   • Smoking status: Current Every Day Smoker     Packs/day: 0.00     Years: 52.00     Types: Cigarettes   • Smokeless tobacco: Former User     Types: Snuff      Comment: 10 cigarettes a day   • Alcohol use 1.8 oz/week     3 Cans of beer per week     Prescriptions Prior to Admission   Medication Sig Dispense Refill Last Dose   • oxyCODONE-acetaminophen (PERCOCET)  MG per tablet Take 1 tablet by mouth Every 6 (Six) Hours As Needed for Moderate Pain . (Patient taking differently: Take 1 tablet by mouth Every 6 (Six) Hours As Needed for Moderate Pain  (SPINAL PAIN).) 60 tablet 0 10/18/2018 at 0100     Allergies:  Codeine    Objective     Vital Signs  Temp:  [98.2 °F (36.8 °C)] 98.2 °F (36.8 °C)  Heart Rate:  [65-82] 65  Resp:  [16-18] 16  BP: (153)/(87) 153/87    Physical Exam:      General Appearance:    Alert, cooperative, in no acute distress   Head:    Normocephalic, without obvious abnormality, atraumatic   Eyes:            Lids and lashes normal, conjunctivae and sclerae normal, no   icterus, no pallor, corneas clear, PERRLA   Ears:    Ears appear intact with no abnormalities noted   Neck:   No adenopathy, supple, trachea midline   Back:     No kyphosis present, no scoliosis present, no skin lesions,      erythema or scars, no tenderness to percussion or                   palpation,   range of motion normal   Lungs:     Clear to auscultation,respirations regular, even and                  unlabored    Heart:    Regular rhythm and normal rate, normal S1 and S2, no            murmur, no gallop, no rub, no click   Chest Wall:    No abnormalities observed   Abdomen:   Soft and upper midline incision.  He still some redness around a left upper quadrant  incision    Rectal:     Deferred   Extremities:   Moves all extremities well, no edema, no cyanosis, no             redness   Pulses:   Pulses palpable and equal bilaterally   Skin:   No bleeding, bruising or rash   Lymph nodes:   No palpable adenopathy   Neurologic:   No focal deficits       Results Review:      Lab Results (last 72 hours)     Procedure Component Value Units Date/Time    POC Glucose Once [[id number]]  (Abnormal) Collected:  10/18/18 0553    Specimen:  Blood Updated:  10/18/18 0607     Glucose 151 (H) mg/dL      Comment: : [id number] Jairo Cardenas ID: [id number]           Imaging Results (last 72 hours)     ** No results found for the last 72 hours. **          Assessment/Plan       Oropharyngeal cancer (CMS/HCC)    History of radiation therapy    Tracheocutaneous fistula following tracheostomy (CMS/HCC)      Will place a repeat gastrostomy tube.  May require takedown of gastrocutaneous fistula and placing into another area.  The risks of bleeding infection injury to structures difficulty due to the prior surgery all were discussed.  He understands this is not as easy as the first time being placed with Meliza and the difficulties with scarring or difficulty with placement of the tube in the left upper quadrant.      Ayanna Early MD  10/18/18  7:31 AM          Electronically signed by Ayanna Early MD at 10/18/2018  7:37 AM     Alber Justin MD at 10/18/2018  7:21 AM        H&P reviewed.  The patient was examined and there are no changes to the H&P   Electronically signed by Alber Justin MD at 10/18/2018  7:21 AM   Source Note              Alber Justin MD   CC:  follow up head and neck cancer    History of Present Illness:  Sameer Tavarez is a 62 y.o. male who presents for follow up. He was recently decanulated and had a persistent tracheocutaneous fistula.  He reports he is having food come out of his stoma when he swallows.  He had a G-tube but he  "removed it himself after having difficulty with drainage and irritation around the site.  He complained that he was not getting follow-up care from the procedure was to put it in.  He has not had difficulty with lymphadenopathy or returning tongue lesion.    Oncology/Hematology History    Sameer Tavarez is a 61 y.o. male with oropharyngeal cancer. He had an EGD on 2/6/17 by Dr Patel with biopsies showing Barretts esophagus and reflux esophagitis. He had continued dysphagia and underwent another EGD and an oropharyngeal biopsy on 8/27/17 with the oropharyngeal biosies showing \"at least squamous cell carcinoma in situ\". CT scanning showed \"oropharyngeal asymmety without overt enhancing mass\" on 8/28/17. He was referred to Dr. Alber Justin MD and diagnosed with a large ulcerative lesion that was in the oral pharynx.  It extended from the right lateral aspect of the uvula and extended to the posterior rim of the soft palate extending to the tonsillar fossa and deeply penetrating into it.  It involved the right base of tongue and extended at least to the midline of the base of tongue.  It extended forward into the lingual tongue muscular approximately 2 cm. He was having difficulty with his secretions and his airway at night, so an awake tracheostomy with direct laryngoscopy and biopsy was performed on 10/5/17. At the time of admission, g tube placement and dental extraction was performed.            Oropharyngeal cancer (CMS/[facility])    8/28/2017 Initial Diagnosis     Oropharyngeal cancer         8/28/2017 Biopsy     Dr Patel ([facility]) - EGD with oropharynx biopsy:    Clinical Information       Pre-Op: Gerd/Hoarsness      Post-Op Esophageal ulcer,barretts oral lesion    Final Diagnosis   1.  Esophagus, endoscopic biopsy:  A.  Fragments of benign squamocolumnar junction mucosa and benign glandular mucosa demonstrating intestinal metaplasia (Recio's esophagus)  B.  Chronic active " inflammation, moderate.  C.  No dysplasia identified.     2.  Oropharynx, biopsy: At least squamous cell carcinoma in situ.     Comment: Status of invasion is indeterminate due to tangential sectioning of several of the tissue fragments.  Immunohistochemical stain for p16 is positive.     AJCC stage:pTX pNX                 8/28/2017 Imaging     [facility]  CT Soft tissue neck  No discrete enhancing mass  Soft tissue thickening right oropharynx and peritonsillar soft tissues compared to left.  Small lymph nodes may be inflammatory in nature         9/13/2017 Imaging     [facility]    CT Chest  Small hiatal hernia  1.1 cm lymph node adjacent to the distal esophagus.  Two small lung nodules.  For multiple solid noncalcified nodules smaller than 6 mm in diameter, no routine follow-up is recommended. (grade 2B; weak recommendation, moderate-quality evidence)  CT Abd Pelvis  Right perineal mass of unknown significance. Please correlate with physical exam.  Dilated small bowel with multiple air-fluid levels. Differential considerations include partial small bowel obstruction vs adynamic ileus.  Mild diverticulosis. Mild distended urinary bladder.         9/29/2017 Imaging     [facility]  MR Orbit Face Neck  1. Large right oropharyngeal mass compatible with the history of  carcinoma.  2. Tongue base and right submandibular gland involvement.    PET  1. Abnormal metabolic activity in the base of the tongue on the right  extending in the right palatine tonsil. SUV is 8.8. This is consistent  with neoplasm. No other regions of abnormal metabolic activity are  identified..           10/5/2017 Procedure     Tracheostomy with Direct Laryngoscopy and biopsy -JUANITA Justin MD     Path    Final Diagnosis   1.  Right superior tonsillar fossa, biopsy:  A.  Moderately differentiated squamous cell carcinoma, invasive.  B.  Immunohistochemical stain for p16 is positive.      AJCC stage: pTX pNX     2.  Right base of  tongue, biopsy:  A.  Moderately differentiated squamous cell carcinoma, invasive.  B.  Immunohistochemical stain for p16 is positive.              10/6/2017 Procedure     Port placement  Gastrostomy tube placement         10/11/2017 Procedure     Teeth extraction- Les Goddard MD, DMD         11/3/2017 Cancer Staged     Oropharyngeal cancer    Staging form: Pharynx - HPV-Mediated Oropharynx, AJCC 8th Edition    - Clinical stage from 11/3/2017: Stage III (cT4, cN0, cM0, p16: Positive) - Signed by Alber Justin MD on 1/1/2018    - Pathologic: No stage assigned - Unsigned         11/9/2017 - 1/16/2018 Chemotherapy/Radiation     Carboplatin and Taxol- Claudino    Radiation OncologyTreatment Course:  Sameer Tavarez received 7000 cGy in 35 fractions to the oral pharynx and neck via External Beam Radiation - EBRT.- Cynthia         6/25/2018 Biopsy     direct laryngoscopy with biopsy- benign          7/10/2018 -  Other Event     Tracheostomy removal            Review of Systems  Reviewed as per patient intake note.    Past History:  Past medical and surgical history, family history and social history reviewed and updated when appropriate.  Current medications and allergies reviewed and updated when appropriate.  Allergies:  Codeine    Vital Signs:  Temp:  [97.6 °F (36.4 °C)] 97.6 °F (36.4 °C)  BP: (126)/(78) 126/78    Physical Exam    CONSTITUTIONAL: well nourished, well-developed, alert, oriented, in no acute distress   COMMUNICATION AND VOICE: able to communicate normally, normal voice quality  HEAD: normocephalic, no lesions, atraumatic, no tenderness, no masses   FACE: appearance normal, no lesions, no tenderness, no deformities, facial motion symmetric  EYES: ocular motility normal, eyelids normal, orbits normal, no proptosis, conjunctiva normal , pupils equal, round  HEARING: response to conversational voice normal bilaterally   EXTERNAL EARS: auricles without lesions  EXTERNAL NOSE: structure normal, no  tenderness on palpation, no nasal discharge, no lesions, no evidence of trauma, nostrils patent  LIPS: structure normal, no tenderness on palpation, no lesions, no evidence of trauma  TEETH: edentulous  GUMS: gingivae healthy  ORAL MUCOSA: oral mucosa with diffuse dryness, no mucosal lesions   TONGUE:  lingual mucosa normal without lesions, normal tongue mobility  post radiation changes present, no evidence of recurrent disease present,  NECK: persistent tracheocutaneous fistula  LYMPH NODES: no lymphadenopathy  CHEST/RESPIRATORY: respiratory effort normal  CARDIOVASCULAR: extremities without cyanosis or edema, no overt jugulovenous distension present  NEUROLOGIC/PSYCHIATRIC: oriented appropriately for age, mood normal, affect appropriate, cranial nerves intact grossly unless specifically mentioned above        Assessment   1. Oropharyngeal cancer (CMS/HCC)    2. History of radiation therapy    3. Tracheocutaneous fistula following tracheostomy (CMS/HCC)    4. Pharyngoesophageal dysphagia        Plan    I am concerned about his swallowing and aspiration.  I have strongly recommended he receive another swallow study, and unless this is improved from the previous one and can be treated with therapeutic measures, I recommended placing the G-tube again at the time of his tracheostomy closure.  At first he was strongly opposed to placing the G-tube again but after discussing it with him further, he would be more open to add as long as he had a different surgeon and had better postoperative care.  If the swallow study shows lopez aspiration, I will contact Gen. surgery to see if this can be done at the same time.    Medical and surgical options were discussed including medical and surgical options. Risks, benefits and alternatives were discussed and questions were answered. After considering the options, the patient decided to proceed with surgery.     -----SURGERY SCHEDULING:-----  Schedule TRACHEOCUTANEOUS FISTULA  "CLOSURE (N/A), DIAGNOSTIC DIRECT LARYNGOSCOPY< POSSIBLE BIOPSY (N/A)     ---INFORMED CONSENT DISCUSSION:---  TRACHEOCUTANEOUS FISTULA CLOSURE WITH WOOKIE FLAP. Risks include, but are not limited to: bleeding, infection, hematoma, persistent fistula, need for additional procedures, flap failure, cosmetic deformity. Patient understands risks and would like to proceed with surgery.      ---PREOPERATIVE WORKUP:---  CBC  CMP  Chest X-Ray  EKG     Follow up  postoperatively    Alber Justin MD  10/03/18  4:56 PM       Electronically signed by Alber Justin MD at 10/3/2018  4:58 PM             Alber Justin MD at 10/3/2018 10:00 AM           Alber Justin MD   CC:  follow up head and neck cancer    History of Present Illness:  Sameer Tavarez is a 62 y.o. male who presents for follow up. He was recently decanulated and had a persistent tracheocutaneous fistula.  He reports he is having food come out of his stoma when he swallows.  He had a G-tube but he removed it himself after having difficulty with drainage and irritation around the site.  He complained that he was not getting follow-up care from the procedure was to put it in.  He has not had difficulty with lymphadenopathy or returning tongue lesion.    Oncology/Hematology History    Sameer Tavarez is a 61 y.o. male with oropharyngeal cancer. He had an EGD on 2/6/17 by Dr Patel with biopsies showing Barretts esophagus and reflux esophagitis. He had continued dysphagia and underwent another EGD and an oropharyngeal biopsy on 8/27/17 with the oropharyngeal biosies showing \"at least squamous cell carcinoma in situ\". CT scanning showed \"oropharyngeal asymmety without overt enhancing mass\" on 8/28/17. He was referred to Dr. Alber Justin MD and diagnosed with a large ulcerative lesion that was in the oral pharynx.  It extended from the right lateral aspect of the uvula and extended to the posterior rim of the soft palate extending to the " tonsillar fossa and deeply penetrating into it.  It involved the right base of tongue and extended at least to the midline of the base of tongue.  It extended forward into the lingual tongue muscular approximately 2 cm. He was having difficulty with his secretions and his airway at night, so an awake tracheostomy with direct laryngoscopy and biopsy was performed on 10/5/17. At the time of admission, g tube placement and dental extraction was performed.            Oropharyngeal cancer (CMS/HCC)    8/28/2017 Initial Diagnosis     Oropharyngeal cancer         8/28/2017 Biopsy     Dr Patel ([facility]) - EGD with oropharynx biopsy:    Clinical Information       Pre-Op: Gerd/Hoarsness      Post-Op Esophageal ulcer,barretts oral lesion    Final Diagnosis   1.  Esophagus, endoscopic biopsy:  A.  Fragments of benign squamocolumnar junction mucosa and benign glandular mucosa demonstrating intestinal metaplasia (Recio's esophagus)  B.  Chronic active inflammation, moderate.  C.  No dysplasia identified.     2.  Oropharynx, biopsy: At least squamous cell carcinoma in situ.     Comment: Status of invasion is indeterminate due to tangential sectioning of several of the tissue fragments.  Immunohistochemical stain for p16 is positive.     AJCC stage:pTX pNX                 8/28/2017 Imaging     [facility]  CT Soft tissue neck  No discrete enhancing mass  Soft tissue thickening right oropharynx and peritonsillar soft tissues compared to left.  Small lymph nodes may be inflammatory in nature         9/13/2017 Imaging     [facility]    CT Chest  Small hiatal hernia  1.1 cm lymph node adjacent to the distal esophagus.  Two small lung nodules.  For multiple solid noncalcified nodules smaller than 6 mm in diameter, no routine follow-up is recommended. (grade 2B; weak recommendation, moderate-quality evidence)  CT Abd Pelvis  Right perineal mass of unknown significance. Please correlate with  physical exam.  Dilated small bowel with multiple air-fluid levels. Differential considerations include partial small bowel obstruction vs adynamic ileus.  Mild diverticulosis. Mild distended urinary bladder.         9/29/2017 Imaging     [facility]  MR Orbit Face Neck  1. Large right oropharyngeal mass compatible with the history of  carcinoma.  2. Tongue base and right submandibular gland involvement.    PET  1. Abnormal metabolic activity in the base of the tongue on the right  extending in the right palatine tonsil. SUV is 8.8. This is consistent  with neoplasm. No other regions of abnormal metabolic activity are  identified..           10/5/2017 Procedure     Tracheostomy with Direct Laryngoscopy and biopsy -JUANITA Justin MD     Path    Final Diagnosis   1.  Right superior tonsillar fossa, biopsy:  A.  Moderately differentiated squamous cell carcinoma, invasive.  B.  Immunohistochemical stain for p16 is positive.      AJCC stage: pTX pNX     2.  Right base of tongue, biopsy:  A.  Moderately differentiated squamous cell carcinoma, invasive.  B.  Immunohistochemical stain for p16 is positive.              10/6/2017 Procedure     Port placement  Gastrostomy tube placement         10/11/2017 Procedure     Teeth extraction- Les Goddard MD, DMD         11/3/2017 Cancer Staged     Oropharyngeal cancer    Staging form: Pharynx - HPV-Mediated Oropharynx, AJCC 8th Edition    - Clinical stage from 11/3/2017: Stage III (cT4, cN0, cM0, p16: Positive) - Signed by Alber Justin MD on 1/1/2018    - Pathologic: No stage assigned - Unsigned         11/9/2017 - 1/16/2018 Chemotherapy/Radiation     Carboplatin and Taxol- Claudino    Radiation OncologyTreatment Course:  Sameer Tavarez received 7000 cGy in 35 fractions to the oral pharynx and neck via External Beam Radiation - EBRT.- Cynthia         6/25/2018 Biopsy     direct laryngoscopy with biopsy- benign          7/10/2018 -  Other Event     Tracheostomy removal             Review of Systems  Reviewed as per patient intake note.    Past History:  Past medical and surgical history, family history and social history reviewed and updated when appropriate.  Current medications and allergies reviewed and updated when appropriate.  Allergies:  Codeine    Vital Signs:  Temp:  [97.6 °F (36.4 °C)] 97.6 °F (36.4 °C)  BP: (126)/(78) 126/78    Physical Exam    CONSTITUTIONAL: well nourished, well-developed, alert, oriented, in no acute distress   COMMUNICATION AND VOICE: able to communicate normally, normal voice quality  HEAD: normocephalic, no lesions, atraumatic, no tenderness, no masses   FACE: appearance normal, no lesions, no tenderness, no deformities, facial motion symmetric  EYES: ocular motility normal, eyelids normal, orbits normal, no proptosis, conjunctiva normal , pupils equal, round  HEARING: response to conversational voice normal bilaterally   EXTERNAL EARS: auricles without lesions  EXTERNAL NOSE: structure normal, no tenderness on palpation, no nasal discharge, no lesions, no evidence of trauma, nostrils patent  LIPS: structure normal, no tenderness on palpation, no lesions, no evidence of trauma  TEETH: edentulous  GUMS: gingivae healthy  ORAL MUCOSA: oral mucosa with diffuse dryness, no mucosal lesions   TONGUE:  lingual mucosa normal without lesions, normal tongue mobility  post radiation changes present, no evidence of recurrent disease present,  NECK: persistent tracheocutaneous fistula  LYMPH NODES: no lymphadenopathy  CHEST/RESPIRATORY: respiratory effort normal  CARDIOVASCULAR: extremities without cyanosis or edema, no overt jugulovenous distension present  NEUROLOGIC/PSYCHIATRIC: oriented appropriately for age, mood normal, affect appropriate, cranial nerves intact grossly unless specifically mentioned above        Assessment   1. Oropharyngeal cancer (CMS/HCC)    2. History of radiation therapy    3. Tracheocutaneous fistula following tracheostomy (CMS/HCC)    4.  Pharyngoesophageal dysphagia        Plan    I am concerned about his swallowing and aspiration.  I have strongly recommended he receive another swallow study, and unless this is improved from the previous one and can be treated with therapeutic measures, I recommended placing the G-tube again at the time of his tracheostomy closure.  At first he was strongly opposed to placing the G-tube again but after discussing it with him further, he would be more open to add as long as he had a different surgeon and had better postoperative care.  If the swallow study shows lopez aspiration, I will contact Gen. surgery to see if this can be done at the same time.    Medical and surgical options were discussed including medical and surgical options. Risks, benefits and alternatives were discussed and questions were answered. After considering the options, the patient decided to proceed with surgery.     -----SURGERY SCHEDULING:-----  Schedule TRACHEOCUTANEOUS FISTULA CLOSURE (N/A), DIAGNOSTIC DIRECT LARYNGOSCOPY< POSSIBLE BIOPSY (N/A)     ---INFORMED CONSENT DISCUSSION:---  TRACHEOCUTANEOUS FISTULA CLOSURE WITH WOOKIE FLAP. Risks include, but are not limited to: bleeding, infection, hematoma, persistent fistula, need for additional procedures, flap failure, cosmetic deformity. Patient understands risks and would like to proceed with surgery.      ---PREOPERATIVE WORKUP:---  CBC  CMP  Chest X-Ray  EKG     Follow up  postoperatively    Alber Justin MD  10/03/18  4:56 PM      Electronically signed by Alber Justin MD at 10/3/2018  4:58 PM          Operative/Procedure Notes (last 72 hours) (Notes from 10/16/2018  9:07 AM through 10/19/2018  9:07 AM)      Alber Justin MD at 10/18/2018  5:50 AM           Alber Justin MD   Operative Note    Sameer Tavarez  10/18/2018    Pre-op Diagnosis:   Oropharyngeal cancer (CMS/HCC) [C10.9]  History of radiation therapy [Z92.3]  Tracheocutaneous fistula following  tracheostomy (CMS/[facility]) [J95.04]    Post-op Diagnosis:     Post-Op Diagnosis Codes:     * Oropharyngeal cancer (CMS/[facility]) [C10.9]     * History of radiation therapy [Z92.3]     * Tracheocutaneous fistula following tracheostomy (CMS/[facility]) [J95.04]    Procedure/CPT® Codes:  CT SURG CLOSURE TRACH/FISTULA [55681]  CT LARYNGOSCOPY,DIRCT,OP,BIOPSY [68477]    Procedure(s):  TRACHEOCUTANEOUS FISTULA CLOSURE  DIAGNOSTIC DIRECT LARYNGOSCOPY WITH BIOPSY  Takedown gastroutaneous fistula with gastrostomy tube plaement (per Dr Talamantes- see seperate dictation)    Surgeon(s):  Alber Justin MD Tyrrell, Dana R, MD    Anesthesia: General    Staff:   Circulator: Latasha Anne RN  Scrub Person: Elyssa Teixeira  Assistant: Karla Johnson    Estimated Blood Loss:   minimal    Specimens:                None       Drains:   none    Findings:   Mature tracheocutaneous fistula  There was postradiation changes in the oral cavity and oropharynx.  There was no obvious mass seen there was some irritation in the previous site of the tumor at the right base of tongue and tonsillar fossa.  I felt this was more consistent with radiation and scarring change rather than recurring tumor.  Biopsies were taken from the right base of tongue and the right tonsillar fossa.    Complications:   none    Reason for the Operation:  Sameer Tavarez is a 62 y.o. male with a history of right tongue cancer s/p radiation therapy. He required a tracheostomy and the site has not closed after decannulation. He also has had continued aspiration and his g tube has come out and closed. Tracheostomy closure and g tube replacement was recommended. After understanding the risks, benefits and alternatives, a consent for the operation was given.     Procedure Description:  The patient was taken back to the operating room, placed supine on the operating table and placed under anesthesia by the anesthesia staff. Once this was done a time out was performed to confirm the  patient and the proper procedure.  The patient was prepped and draped in the standard fashion both for primary for my portion of the procedure and Dr. Talamantes's procedure.  He performed a gastrostomy work well I worked on the tracheocutaneous fistula.  The area was marked and injected with 1% lidocaine with 1 100,000 parts adrenaline.  Triangular skin flaps were created in the lateral aspect of the stoma.  These were then elevated to the edge of the stoma and trimmed.  I then used imbricating 4-0 Vicryl sutures to close the stoma with the skin flaps.  I then elevated the donor area to allow for a second layer of closure over top of this.  I closed the wound with 4-0 Vicryl sutures in the deep tissue followed by 5-0 fast absorbing plain gut in a running stitch.  A dressing was placed.  After the completion of the gastrostomy tube placement, the table was turned 90° to facilitate axis to the head.  The patient was prepped and draped in the standard laryngoscopy fashion.  A tooth guard was placed to protect the teeth.  Direct laryngoscopy was carried out with the anterior commissure laryngoscope systematically examining all the structures of the oropharynx, supraglottic larynx, true vocal cords, and hypopharynx. There was radiation dryness and irritation throughout the examination.  There is a lot of thick secretions in the larynx.  There is no obvious mass that I could visually see or palpate.  There was some fullness/firmness of the right tonsil and base of tongue that I felt was more consistent with posttreatment change rather than persisting and/or recurring tumor.  However, I took biopsies of the right tonsillar fossa and base of tongue region and sent it for permanent section.  No significant bleeding was encountered.  The patient was then turned over to the anesthesia team and allowed to wake from anesthesia. The patient was transported to the recovery room in a stable condition.       Alber Justin MD      Date: 10/18/2018  Time: 8:27 AM      Electronically signed by Alber Justin MD at 10/18/2018  9:13 AM     Ayanna Early MD at 10/18/2018  5:50 AM          Ayanna Early MD Operative Note    Sameer Tavarez  10/18/2018    Pre-op Diagnosis:   Oropharyngeal cancer (CMS/HCC) [C10.9]  History of radiation therapy [Z92.3]  Tracheocutaneous fistula following tracheostomy (CMS/HCC) [J95.04]    Post-op Diagnosis:     same    Procedure/CPT® Codes:  NE SURG CLOSURE TRACH/FISTULA [61689]  NE LARYNGOSCOPY,DIRCT,OP,BIOPSY [78649]    Procedure(s):  TRACHEOCUTANEOUS FISTULA CLOSURE  DIAGNOSTIC DIRECT LARYNGOSCOPY< POSSIBLE BIOPSY  takedown gastroutaneous fistula with gastrostomy tube plaement    Surgeon(s):  Alber Justin MD Tyrrell, Dana R, MD    Anesthesia: General    Staff:   Circulator: Latasha Anne RN  Scrub Person: Elyssa Teixeira  Assistant: Karla Johnson    Estimated Blood Loss: minimal    Specimens:                ID Type Source Tests Collected by Time   A : gastrocutaneous fistula tract Tissue Stomach TISSUE PATHOLOGY EXAM Alber Justin MD 10/18/2018 0830         Drains:   Gastrostomy/Enterostomy Gastrostomy 22 Fr. LUQ (Active)       Indications: Patient had prior G-tube with prolonged drainage after removal.  He still unable swallow needs another feeding tube.    Findings: Gastric changes fistula with body of stomach adherent to the abdominal wall.    Complications: none    Procedure: The patient was brought to the operating room and placed in the supine position.  After induction of general anesthesia and infusion of IV antibiotics, the patient was prepped and draped in the usual sterile fashion.  Mid line incision was made and the abdomen entered.  We were working simultaneously with Dr. Georges.  Adhesions were dissected with sharp and electrocautery dissection.  I attempted to take down the stomach but there was a persistent fistula.  Once this was taken down I elevated with Allis's  and resected this fistulous tract with the TA 60 stapler.  I oversewed the staple line with 2-0 silk seromuscular sutures.  I then went above that area and placed 2 pursestring sutures of 2-0 silk.  I passed the G-tube through a separate incision left upper quadrant.  It was a 22 [place] TANJA gastrostomy tube.  We passed it into the osteotomy hole that was made between the 2 purse string sutures in these pursestring sutures were tied.  The anterior stomach wall was sutured to the posterior surface of the anterior abdominal wall with four-quadrant 2-0 silk sutures.  We then inflated the balloon and pulled the stomach up to the abdominal wall and tied the sutures.  Irrigation was performed.  We then closed the fascia with running PDS.  Irrigated the wound and closed with 2-0 Vicryl and staples.  The G-tube was sutured to the skin with 2-0 silk.  Dressing was placed patient was awakened transferred to the cover him stable condition tolerated the procedure well.  At the end of the procedure counts correct.    Ayanna Early MD     Date: 10/18/2018  Time: 8:39 AM      Electronically signed by Ayanna Early MD at 10/18/2018  8:43 AM          Physician Progress Notes (last 24 hours) (Notes from 10/18/2018  9:07 AM through 10/19/2018  9:07 AM)      Alber Justin MD at 10/19/2018  7:55 AM           Alber Justin MD     Chief Complaint:  Unable to pee    Interval History:   He has had a lot of pain in his abdomen overnight.  He has not been unable to urinate.  He initially with straight cath last night but is refusing both the bladder scan and straight cath according the nurses.  He has not had any difficulty with the neck wound.    Review of Systems:   Review of Systems   Constitutional: Negative for chills and fever.   HENT:        As per HPI   Respiratory: Negative for cough, shortness of breath and stridor.    Gastrointestinal: Positive for abdominal pain. Negative for nausea and vomiting.    Genitourinary: Positive for difficulty urinating.       Vital Signs  Temp:  [97.3 °F (36.3 °C)-98.7 °F (37.1 °C)] 98.5 °F (36.9 °C)  Heart Rate:  [55-92] 90  Resp:  [11-17] 16  BP: (101-184)/() 101/47    Physical Exam:  He in no acute distress  Neck wound with dry dressing with no obvious drainage.  Voice is normal with no hoarseness stridor or stertor        Medications, Allergies and Past History Reviewed    Assessment & Plan  1. Oropharyngeal cancer (CMS/HCC)    2. History of radiation therapy    3. Tracheocutaneous fistula following tracheostomy (CMS/[facility])      I instructed nursing to still work with him about his urination.  If not we might need to get urology to see him and give recommendations.  We need to get them up out of bed and ambulating this morning.  Hopefully we can start his tube feeds.  We need to work with social work to get his social situation figured out to give him supplies for both G-tube care and for tube feeding.  He is working with speech therapy.  I am hoping to be able to get him discharged today or tomorrow.    Alber Justin MD  10/19/18  7:55 AM        Electronically signed by Alber Justin MD at 10/19/2018  8:00 AM          Speech Language Pathology Notes (last 72 hours) (Notes from 10/16/2018  9:07 AM through 10/19/2018  9:07 AM)      Roseann Garcia, CCC-SLP at 10/18/2018  2:07 PM          Acute Care - Speech Language Pathology   Swallow Initial Evaluation [facility]     Patient Name: Sameer Tavarez  : 1956  MRN: [id number]  Today's Date: 10/18/2018               Admit Date: 10/18/2018  Clinical bedside swallow evaluation. Pureed, honey, nectar, and thin consistencies were presented. Pt has a history of dysphagia secondary to oropharyngeal cancer and radiation treatment. His last outpatient VFSS showed laryngeal penetration with most consistencies and aspiration of nectar thick. He just had his G-tube replaced and persistent tracheocutaneous fistula  Name band; repaired today. He reports that he was still having food/drink expelled from his stoma site this week. It did not sound as though he was following previous recommendations for mechanical soft solids and honey thick liquids. Today he exhibited up to 5 multiple swallows with pureed. He had a 1x delayed throat clear with nectar thick after attempting voicing. He also had more immediate throat clearing/coughing 2x with thin.   Recommend:  1. Continuing NPO except for occasional ice chips and meds whole or crushed with pudding or honey thick consistencies.   2. VFSS in radiology tomorrow, 10/19/18.   3. Oral care BID and PRN.   Roseann Garcia, CCC-SLP 10/18/2018 2:09 PM    Visit Dx:     ICD-10-CM ICD-9-CM   1. Oropharyngeal cancer (CMS/HCC) C10.9 146.9   2. History of radiation therapy Z92.3 V15.3   3. Tracheocutaneous fistula following tracheostomy (CMS/[facility]) J95.04 519.09     Patient Active Problem List   Diagnosis   • Oropharyngeal cancer (CMS/HCC)   • Current smoker   • Perineal mass in male   • Recio's esophagus with dysplasia   • Postoperative pain   • S/P percutaneous endoscopic gastrostomy (PEG) tube placement (CMS/HCC)   • Constipation, acute   • Pain, rectal   • Oropharyngeal candidiasis   • ACS (acute coronary syndrome) (CMS/HCC)   • HTN (hypertension)   • History of radiation therapy   • Current every day smoker   • Tobacco use   • Encounter for follow-up surveillance of oral cavity cancer   • Tracheocutaneous fistula following tracheostomy (CMS/HCC)     Past Medical History:   Diagnosis Date   • Arthritis    • Coronary artery disease    • Depression    • Diabetes mellitus (CMS/HCC)    • Difficulty urinating    • Enlarged prostate    • GERD (gastroesophageal reflux disease)    • History of BPH    • History of transfusion    • Hyperlipidemia    • Hypertension    • Neuropathy    • Oropharyngeal cancer (CMS/HCC) 08/27/2017   • Seizure (CMS/HCC)     diabetic   • Severe esophageal dysplasia    • Stroke  (CMS/HCC)    • TB (pulmonary tuberculosis) 2004   • Tracheostomy present (CMS/HCC)     PLUGGED     Past Surgical History:   Procedure Laterality Date   • CARDIAC SURGERY      9-10 stents   • CHOLECYSTECTOMY     • CORONARY ARTERY BYPASS GRAFT      2009   • FRACTURE SURGERY     • GTUBE REPLACEMENT N/A 10/6/2017    Procedure: GASTROSTOMY TUBE REPLACEMENT, port placement;  Surgeon: Jayne Phillips MD;  Location:  [facility] OR;  Service:    • HERNIA REPAIR     • LARYNGOSCOPY N/A 10/11/2017    Procedure: DIRECT LARYNGOSCOPY WITH BIOPSY;  Surgeon: Darin Neal MD;  Location:  [facility] OR;  Service:    • LARYNGOSCOPY N/A 6/25/2018    Procedure: DIRECT LARYNGOSCOPY WITH BIOPSY;  Surgeon: Alber Justin MD;  Location:  [facility] OR;  Service: ENT   • NISSEN FUNDOPLICATION LAPAROSCOPIC     • SC DENTAL SURGERY PROCEDURE N/A 10/11/2017    Procedure: TOOTH EXTRACTION;  Surgeon: Darin Neal MD;  Location:  [facility] OR;  Service: ENT   • SC INSJ TUNNELED CVC W/O SUBQ PORT/ AGE 5 YR/> Left 10/6/2017    Procedure: INSERTION VENOUS ACCESS DEVICE;  Surgeon: Jayne Phillips MD;  Location:  [facility] OR;  Service: General   • SKIN BIOPSY     • TESTICLE SURGERY      tubes implanted for drainage   • TONSILLECTOMY     • TRACHEOSTOMY N/A 10/5/2017    Procedure: Awake tracheostomy; DIRECT LARYNGOSCOPY WITH BIOPSY;  Surgeon: Alber Justin MD;  Location:  [facility] OR;  Service:           SWALLOW EVALUATION (last 72 hours)      SLP Adult Swallow Evaluation     Row Name 10/18/18 1255                   Rehab Evaluation    Document Type evaluation  -MB        Subjective Information complains of;pain  -MB        Patient Observations alert;cooperative  -MB           General Information    Patient Profile Reviewed yes  -MB        Pertinent History Of Current Problem G-tube replaced and persistent tracheocutaneous fistula repaired 10/18/18. Hx of oropharyngeal cancer, g-tube placement and pt removal.  -MB        Current Method of Nutrition  NPO  -MB        Precautions/Limitations, Vision WFL;for purposes of eval  -MB        Precautions/Limitations, Hearing WFL;for purposes of eval  -MB        Prior Level of Function-Communication WFL  -MB        Prior Level of Function-Swallowing mechanical soft textures;honey thick liquids;other (see comments)   recommended from last OP MBS  -MB        Plans/Goals Discussed with patient  -MB        Barriers to Rehab medically complex  -MB        Patient's Goals for Discharge return to PO diet  -MB           Pain Assessment    Additional Documentation Pain Scale: FACES Pre/Post-Treatment (Group)  -MB           Pain Scale: FACES Pre/Post-Treatment    Pain: FACES Scale, Pretreatment 6-->hurts even more  -MB        Pre/Post Treatment Pain Comment RN, gave pain meds before SLP started swallow eval  -MB           Oral Motor and Function    Dentition Assessment edentulous, does not have dentures  -MB        Secretion Management WNL/WFL  -MB        Mucosal Quality dry  -MB           Oral Musculature and Cranial Nerve Assessment    Oral Motor General Assessment oral labial or buccal impairment;lingual impairment;vocal impairment  -MB        Oral Labial or Buccal Impairment, Detail, Cranial Nerve VII (Facial): CN7: Motor Impairment;reduced ROM;reduced strength bilaterally  -MB        Lingual Impairment, Detail. Cranial Nerves IX, XII (Glossopharyngeal and Hypoglossal) CN12: Motor Impairment;reduced lingual ROM;reduced strength  -MB        Vocal Impairment, Detail. Cranial Nerve X (Vagus) CN10: Motor;vocal quality abnormality (see comments);other (see comments)   hoarse  -MB           General Eating/Swallowing Observations    Respiratory Support Currently in Use nasal cannula  -MB        Eating/Swallowing Skills fed by SLP  -MB        Positioning During Eating semi-reclined  -MB        Utensils Used spoon;straw  -MB        Consistencies Trialed pureed;thin liquids;nectar/syrup-thick liquids;honey-thick liquids  -MB            Clinical Swallow Eval    Oral Prep Phase WFL   no solids were presented  -MB        Oral Transit WFL  -MB        Oral Residue WFL  -MB        Pharyngeal Phase suspected pharyngeal impairment  -MB        Clinical Swallow Evaluation Summary Pureed, honey, nectar, and thin consistencies were presented. Pt has a history of dysphagia secondary to oropharyngeal cancer and radiation treatment. His last outpatient VFSS showed laryngeal penetration with most consistencies and aspiration of nectar thick. 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 He reports that he was still having food/drink expelled from his stoma site this week. It did not sound as though he was following previous recommendations for mechanical soft solids and honey thick liquids. Today he exhibited up to 5 multiple swallows with pureed. He had a 1x delayed throat clear with nectar thick after attempting voicing. He also had more immediate throat clearing/coughing 2x with thin.   -MB           Pharyngeal Phase Concerns    Pharyngeal Phase Concerns multiple swallows;cough;throat clear  -MB        Multiple Swallows pudding  -MB        Cough thin  -MB        Throat Clear thin;nectar  -MB           Clinical Impression    SLP Swallowing Diagnosis functional oral phase;mod-severe;suspected pharyngeal dysfunction  -MB        Functional Impact risk of aspiration/pneumonia;risk of malnutrition;risk of dehydration  -MB        Rehab Potential/Prognosis, Swallowing re-evaluate goals as necessary  -MB        Swallow Criteria for Skilled Therapeutic Interventions Met demonstrates skilled criteria  -MB           Recommendations    Therapy Frequency (Swallow) 3 days per week  -MB        Predicted Duration Therapy Intervention (Days) until discharge  -MB        SLP Diet Recommendation NPO;other (see comments)   except occasional ice chips  -MB        Recommended Diagnostics VFSS (MBS);other (see comments)   tomorrow 10/19/18   -MB        SLP Rec. for Method of Medication Administration meds whole;meds crushed;with thick liquids;with pudding or applesauce  -MB        Monitor for Signs of Aspiration yes;cough;gurgly voice;throat clearing  -MB        Anticipated Dischage Disposition home  -MB           Swallow Goals (SLP)    Oral Nutrition/Hydration Goal Selection (SLP) oral nutrition/hydration, SLP goal 1  -MB           Oral Nutrition/Hydration Goal 1 (SLP)    Oral Nutrition/Hydration Goal 1, SLP Pt will tolerate least restrictive diet with no overt s/s of aspiration.  -MB        Time Frame (Oral Nutrition/Hydration Goal 1, SLP) by discharge  -MB        Barriers (Oral Nutrition/Hydration Goal 1, SLP) n/a  -MB        Progress/Outcomes (Oral Nutrition/Hydration Goal 1, SLP) goal ongoing  -MB          User Key  (r) = Recorded By, (t) = Taken By, (c) = Cosigned By    Initials Name Effective Dates    Roseann Galicia, CCC-SLP 08/02/16 -         EDUCATION  The patient has been educated in the following areas:   Dysphagia (Swallowing Impairment).    SLP Recommendation and Plan  SLP Swallowing Diagnosis: functional oral phase, mod-severe, suspected pharyngeal dysfunction  SLP Diet Recommendation: NPO, other (see comments) (except occasional ice chips)        Monitor for Signs of Aspiration: yes, cough, gurgly voice, throat clearing  Recommended Diagnostics: VFSS ([facility]), other (see comments) (tomorrow 10/19/18)  Swallow Criteria for Skilled Therapeutic Interventions Met: demonstrates skilled criteria  Anticipated Dischage Disposition: home  Rehab Potential/Prognosis, Swallowing: re-evaluate goals as necessary  Therapy Frequency (Swallow): 3 days per week  Predicted Duration Therapy Intervention (Days): until discharge       Plan of Care Reviewed With: patient  Plan of Care Review  Plan of Care Reviewed With: patient  Outcome Summary: Clinical bedside swallow evaluation. Pureed, honey, nectar, and thin consistencies were presented. Pt has a  history of dysphagia secondary to oropharyngeal cancer and radiation treatment. His last outpatient VFSS showed laryngeal penetration with most consistencies and aspiration of nectar thick. 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 He reports that he was still having food/drink expelled from his stoma site this week. It did not sound as though he was following previous recommendations for mechanical soft solids and honey thick liquids. Today he exhibited up to 5 multiple swallows with pureed. He had a 1x delayed throat clear with nectar thick after attempting voicing. He also had more immediate throat clearing/coughing 2x with thin. Recommend continuing NPO except for occasional ice chips and meds whole or crushed with pudding or honey thick consistencies. VFSS in radiology tomorrow, 10/19/18. Oral care BID and PRN.           SLP GOALS     Row Name 10/18/18 1259             Oral Nutrition/Hydration Goal 1 (SLP)    Oral Nutrition/Hydration Goal 1, SLP Pt will tolerate least restrictive diet with no overt s/s of aspiration.  -MB      Time Frame (Oral Nutrition/Hydration Goal 1, SLP) by discharge  -MB      Barriers (Oral Nutrition/Hydration Goal 1, SLP) n/a  -MB      Progress/Outcomes (Oral Nutrition/Hydration Goal 1, SLP) goal ongoing  -MB        User Key  (r) = Recorded By, (t) = Taken By, (c) = Cosigned By    Initials Name Provider Type    Rosenan Galicia CCC-SLP Speech and Language Pathologist             SLP Outcome Measures (last 72 hours)      SLP Outcome Measures     Row Name 10/18/18 1400             SLP Outcome Measures    Outcome Measure Used? Adult NOMS  -MB         Adult FCM Scores    FCM Chosen Swallowing  -MB      Swallowing FCM Score 2  -MB        User Key  (r) = Recorded By, (t) = Taken By, (c) = Cosigned By    Initials Name Effective Dates    Roseann Galicia CCC-SLP 08/02/16 -            Time Calculation:         Time Calculation- SLP     Row  Name 10/18/18 1408             Time Calculation- SLP    SLP Start Time 1255  -MB      SLP Stop Time 1408  -MB      SLP Time Calculation (min) 73 min  -MB      SLP Received On 10/18/18  -MB      SLP Goal Re-Cert Due Date 10/28/18  -MB        User Key  (r) = Recorded By, (t) = Taken By, (c) = Cosigned By    Initials Name Provider Type    Roseann Galicia CCC-SLP Speech and Language Pathologist          Therapy Charges for Today     Code Description Service Date Service Provider Modifiers Qty    88237360527 HC ST SWALLOWING CURRENT STATUS 10/18/2018 Roseann Garcia CCC-SLP GN, CM 1    04464980918 HC ST SWALLOWING PROJECTED 10/18/2018 Roseann Garcia CCC-SLP GN, CL 1    [id number] HC ST EVAL ORAL PHARYNG SWALLOW 5 10/18/2018 Roseann Garcia CCC-SLP GN 1          SLP G-Codes  SLP NOMS Used?: Yes  Functional Limitations: Swallowing  Swallow Current Status (): At least 80 percent but less than 100 percent impaired, limited or restricted  Swallow Goal Status (): At least 60 percent but less than 80 percent impaired, limited or restricted    EARL Kearney  10/18/2018    Electronically signed by Roseann Garcia CCC-SLP at 10/18/2018  2:09 PM     Roseann Garcia CCC-SLP at 10/18/2018  2:04 PM          Problem: Patient Care Overview  Goal: Plan of Care Review  Outcome: Ongoing (interventions implemented as appropriate)   10/18/18 1403   Coping/Psychosocial   Plan of Care Reviewed With patient   OTHER   Outcome Summary Clinical bedside swallow evaluation. Pureed, honey, nectar, and thin consistencies were presented. Pt has a history of dysphagia secondary to oropharyngeal cancer and radiation treatment. His last outpatient VFSS showed laryngeal penetration with most consistencies and aspiration of nectar thick. 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 He reports that he was still having food/drink expelled from his stoma site this  week. It did not sound as though he was following previous recommendations for mechanical soft solids and honey thick liquids. Today he exhibited up to 5 multiple swallows with pureed. He had a 1x delayed throat clear with nectar thick after attempting voicing. He also had more immediate throat clearing/coughing 2x with thin. Recommend continuing NPO except for occasional ice chips and meds whole or crushed with pudding or honey thick consistencies. VFSS in radiology tomorrow, 10/19/18. Oral care BID and PRN.            Electronically signed by Roseann Garcia CCC-SLP at 10/18/2018  2:04 PM

## 2025-04-13 NOTE — ED ADULT NURSE NOTE - PRIMARY CARE PROVIDER
unk Spoke with daughter, Judy, confirmed pt does have hx of syncope. Had holter many years ago with her Cardiologist, Dr. Jorge Mejia. Pts PMD, Dr. Agnes Boone. Penelope Chen PA-C Pt accepted for admission by Dr. Celeste. Penelope Chen PA-C C spine cleared and c collar removed. Pt accepted for admission by Dr. Celeste. Penelope Chen PA-C